# Patient Record
Sex: MALE | Employment: OTHER | URBAN - METROPOLITAN AREA
[De-identification: names, ages, dates, MRNs, and addresses within clinical notes are randomized per-mention and may not be internally consistent; named-entity substitution may affect disease eponyms.]

---

## 2018-11-19 ENCOUNTER — HOSPITAL ENCOUNTER (INPATIENT)
Age: 68
LOS: 1 days | Discharge: HOME | DRG: 247 | End: 2018-11-20
Attending: INTERNAL MEDICINE | Admitting: INTERNAL MEDICINE
Payer: MEDICARE

## 2018-11-19 DIAGNOSIS — I25.10 CAD IN NATIVE ARTERY: ICD-10-CM

## 2018-11-19 PROBLEM — M19.90 ARTHRITIS: Status: ACTIVE | Noted: 2018-11-19

## 2018-11-19 PROBLEM — I10 HTN (HYPERTENSION): Status: ACTIVE | Noted: 2018-11-19

## 2018-11-19 PROBLEM — T82.855A CORONARY STENT RESTENOSIS: Status: ACTIVE | Noted: 2018-11-19

## 2018-11-19 PROBLEM — E78.5 HLD (HYPERLIPIDEMIA): Status: ACTIVE | Noted: 2018-11-19

## 2018-11-19 PROBLEM — I21.4 NON-STEMI (NON-ST ELEVATED MYOCARDIAL INFARCTION) (HCC): Status: ACTIVE | Noted: 2018-11-19

## 2018-11-19 PROBLEM — Z79.02 ENCOUNTER FOR LONG-TERM (CURRENT) USE OF ANTIPLATELETS/ANTITHROMBOTICS: Status: ACTIVE | Noted: 2018-11-19

## 2018-11-19 PROBLEM — Z95.5 STATUS POST INSERTION OF DRUG-ELUTING STENT INTO LEFT ANTERIOR DESCENDING (LAD) ARTERY: Status: ACTIVE | Noted: 2018-11-19

## 2018-11-19 LAB
ABO/RH: NORMAL
ANTIBODY SCREEN: NORMAL
CREAT SERPL-MCNC: 0.9 MG/DL (ref 0.7–1.2)
GFR AFRICAN AMERICAN: >60
GFR NON-AFRICAN AMERICAN: >60 ML/MIN/1.73
POC ACT LR: 309 SECONDS
POC ACT LR: 383 SECONDS

## 2018-11-19 PROCEDURE — C1725 CATH, TRANSLUMIN NON-LASER: HCPCS

## 2018-11-19 PROCEDURE — 2500000003 HC RX 250 WO HCPCS

## 2018-11-19 PROCEDURE — 6370000000 HC RX 637 (ALT 250 FOR IP)

## 2018-11-19 PROCEDURE — B2111ZZ FLUOROSCOPY OF MULTIPLE CORONARY ARTERIES USING LOW OSMOLAR CONTRAST: ICD-10-PCS | Performed by: INTERNAL MEDICINE

## 2018-11-19 PROCEDURE — 6370000000 HC RX 637 (ALT 250 FOR IP): Performed by: INTERNAL MEDICINE

## 2018-11-19 PROCEDURE — 93458 L HRT ARTERY/VENTRICLE ANGIO: CPT | Performed by: INTERNAL MEDICINE

## 2018-11-19 PROCEDURE — 92928 PRQ TCAT PLMT NTRAC ST 1 LES: CPT | Performed by: INTERNAL MEDICINE

## 2018-11-19 PROCEDURE — 4A023N7 MEASUREMENT OF CARDIAC SAMPLING AND PRESSURE, LEFT HEART, PERCUTANEOUS APPROACH: ICD-10-PCS | Performed by: INTERNAL MEDICINE

## 2018-11-19 PROCEDURE — 86900 BLOOD TYPING SEROLOGIC ABO: CPT

## 2018-11-19 PROCEDURE — C1874 STENT, COATED/COV W/DEL SYS: HCPCS

## 2018-11-19 PROCEDURE — 86850 RBC ANTIBODY SCREEN: CPT

## 2018-11-19 PROCEDURE — 36415 COLL VENOUS BLD VENIPUNCTURE: CPT

## 2018-11-19 PROCEDURE — 2140000000 HC CCU INTERMEDIATE R&B

## 2018-11-19 PROCEDURE — 86901 BLOOD TYPING SEROLOGIC RH(D): CPT

## 2018-11-19 PROCEDURE — 2580000003 HC RX 258: Performed by: INTERNAL MEDICINE

## 2018-11-19 PROCEDURE — 2580000003 HC RX 258: Performed by: STUDENT IN AN ORGANIZED HEALTH CARE EDUCATION/TRAINING PROGRAM

## 2018-11-19 PROCEDURE — C1887 CATHETER, GUIDING: HCPCS

## 2018-11-19 PROCEDURE — 027034Z DILATION OF CORONARY ARTERY, ONE ARTERY WITH DRUG-ELUTING INTRALUMINAL DEVICE, PERCUTANEOUS APPROACH: ICD-10-PCS | Performed by: INTERNAL MEDICINE

## 2018-11-19 PROCEDURE — 82565 ASSAY OF CREATININE: CPT

## 2018-11-19 PROCEDURE — 6360000002 HC RX W HCPCS

## 2018-11-19 PROCEDURE — 2709999900 HC NON-CHARGEABLE SUPPLY

## 2018-11-19 PROCEDURE — C1894 INTRO/SHEATH, NON-LASER: HCPCS

## 2018-11-19 PROCEDURE — C1769 GUIDE WIRE: HCPCS

## 2018-11-19 PROCEDURE — 85347 COAGULATION TIME ACTIVATED: CPT

## 2018-11-19 PROCEDURE — 02C03ZZ EXTIRPATION OF MATTER FROM CORONARY ARTERY, ONE ARTERY, PERCUTANEOUS APPROACH: ICD-10-PCS | Performed by: INTERNAL MEDICINE

## 2018-11-19 PROCEDURE — C1757 CATH, THROMBECTOMY/EMBOLECT: HCPCS

## 2018-11-19 PROCEDURE — B2151ZZ FLUOROSCOPY OF LEFT HEART USING LOW OSMOLAR CONTRAST: ICD-10-PCS | Performed by: INTERNAL MEDICINE

## 2018-11-19 RX ORDER — ATORVASTATIN CALCIUM 40 MG/1
40 TABLET, FILM COATED ORAL DAILY
COMMUNITY

## 2018-11-19 RX ORDER — SODIUM CHLORIDE 9 MG/ML
INJECTION, SOLUTION INTRAVENOUS ONCE
Status: COMPLETED | OUTPATIENT
Start: 2018-11-19 | End: 2018-11-19

## 2018-11-19 RX ORDER — SODIUM CHLORIDE 0.9 % (FLUSH) 0.9 %
10 SYRINGE (ML) INJECTION EVERY 12 HOURS SCHEDULED
Status: DISCONTINUED | OUTPATIENT
Start: 2018-11-19 | End: 2018-11-20 | Stop reason: HOSPADM

## 2018-11-19 RX ORDER — ATORVASTATIN CALCIUM 40 MG/1
40 TABLET, FILM COATED ORAL DAILY
Status: DISCONTINUED | OUTPATIENT
Start: 2018-11-19 | End: 2018-11-20 | Stop reason: HOSPADM

## 2018-11-19 RX ORDER — ONDANSETRON 2 MG/ML
4 INJECTION INTRAMUSCULAR; INTRAVENOUS EVERY 6 HOURS PRN
Status: DISCONTINUED | OUTPATIENT
Start: 2018-11-19 | End: 2018-11-20 | Stop reason: HOSPADM

## 2018-11-19 RX ORDER — ASPIRIN 81 MG/1
81 TABLET ORAL DAILY
Status: DISCONTINUED | OUTPATIENT
Start: 2018-11-19 | End: 2018-11-20 | Stop reason: HOSPADM

## 2018-11-19 RX ORDER — SODIUM CHLORIDE 9 MG/ML
INJECTION, SOLUTION INTRAVENOUS CONTINUOUS
Status: ACTIVE | OUTPATIENT
Start: 2018-11-19 | End: 2018-11-19

## 2018-11-19 RX ORDER — SODIUM CHLORIDE 0.9 % (FLUSH) 0.9 %
10 SYRINGE (ML) INJECTION PRN
Status: DISCONTINUED | OUTPATIENT
Start: 2018-11-19 | End: 2018-11-20 | Stop reason: HOSPADM

## 2018-11-19 RX ORDER — OXYCODONE HYDROCHLORIDE AND ACETAMINOPHEN 5; 325 MG/1; MG/1
1 TABLET ORAL EVERY 4 HOURS PRN
Status: DISCONTINUED | OUTPATIENT
Start: 2018-11-19 | End: 2018-11-20 | Stop reason: HOSPADM

## 2018-11-19 RX ORDER — DICLOFENAC SODIUM AND MISOPROSTOL 75; 200 MG/1; UG/1
1 TABLET, DELAYED RELEASE ORAL 2 TIMES DAILY
COMMUNITY

## 2018-11-19 RX ORDER — ATORVASTATIN CALCIUM 40 MG/1
40 TABLET, FILM COATED ORAL NIGHTLY
Status: DISCONTINUED | OUTPATIENT
Start: 2018-11-19 | End: 2018-11-19 | Stop reason: SDUPTHER

## 2018-11-19 RX ORDER — ACETAMINOPHEN 325 MG/1
650 TABLET ORAL EVERY 4 HOURS PRN
Status: DISCONTINUED | OUTPATIENT
Start: 2018-11-19 | End: 2018-11-20 | Stop reason: HOSPADM

## 2018-11-19 RX ORDER — ASPIRIN 81 MG/1
81 TABLET ORAL DAILY
Status: DISCONTINUED | OUTPATIENT
Start: 2018-11-19 | End: 2018-11-19 | Stop reason: SDUPTHER

## 2018-11-19 RX ADMIN — TICAGRELOR 90 MG: 90 TABLET ORAL at 21:20

## 2018-11-19 RX ADMIN — METOPROLOL TARTRATE 25 MG: 25 TABLET ORAL at 16:09

## 2018-11-19 RX ADMIN — Medication 10 ML: at 21:21

## 2018-11-19 RX ADMIN — ATORVASTATIN CALCIUM 40 MG: 40 TABLET, FILM COATED ORAL at 21:20

## 2018-11-19 RX ADMIN — SODIUM CHLORIDE: 9 INJECTION, SOLUTION INTRAVENOUS at 09:42

## 2018-11-19 ASSESSMENT — PAIN SCALES - GENERAL
PAINLEVEL_OUTOF10: 0

## 2018-11-19 NOTE — H&P
that he has never smoked. His smokeless tobacco use includes Snuff. ETOH:   has no alcohol history on file. Family History:      Positive as follows:    Family History   Problem Relation Age of Onset    Heart Disease Mother        REVIEW OF SYSTEMS:   Pertinent positives as noted in the HPI. All other systems reviewed and negative. PHYSICAL EXAM:    /82   Pulse 68   Temp 97.5 °F (36.4 °C) (Temporal)   Resp 16   Ht 5' 9\" (1.753 m)   Wt 180 lb (81.6 kg)   BMI 26.58 kg/m²     General appearance:  No apparent distress, appears stated age and cooperative. HEENT:  Normal cephalic, atraumatic without obvious deformity. Pupils equal, round, and reactive to light. Extra ocular muscles intact. Conjunctivae/corneas clear. Neck: Supple, with full range of motion. No jugular venous distention. Trachea midline. Respiratory:  Normal respiratory effort. Clear to auscultation, bilaterally without Rales/Wheezes/Rhonchi. Cardiovascular:  Regular rate and rhythm with normal S1/S2 without murmurs, rubs or gallops. Abdomen: Soft, non-tender, non-distended with normal bowel sounds. Musculoskeletal:  No clubbing, cyanosis or edema bilaterally. Full range of motion without deformity. Skin: Skin color, texture, turgor normal.  No rashes or lesions. Neurologic:  Neurovascularly intact without any focal sensory/motor deficits. Psychiatric:  Alert and oriented, thought content appropriate, normal insight  Capillary Refill: Brisk,< 3 seconds   Peripheral Pulses: +2 palpable, equal bilaterally         Labs:     No results for input(s): WBC, HGB, HCT, PLT in the last 72 hours. No results for input(s): NA, K, CL, CO2, BUN, CREATININE, CALCIUM, PHOS in the last 72 hours. Invalid input(s): MAGNES  No results for input(s): AST, ALT, BILIDIR, BILITOT, ALKPHOS in the last 72 hours. No results for input(s): INR in the last 72 hours. No results for input(s): Verlyn Burn in the last 72 hours.     Urinalysis:

## 2018-11-20 VITALS
BODY MASS INDEX: 26.66 KG/M2 | DIASTOLIC BLOOD PRESSURE: 78 MMHG | HEART RATE: 57 BPM | RESPIRATION RATE: 16 BRPM | WEIGHT: 180 LBS | HEIGHT: 69 IN | SYSTOLIC BLOOD PRESSURE: 110 MMHG | OXYGEN SATURATION: 95 % | TEMPERATURE: 98.2 F

## 2018-11-20 LAB
ANION GAP SERPL CALCULATED.3IONS-SCNC: 13 MMOL/L (ref 7–16)
BUN BLDV-MCNC: 12 MG/DL (ref 8–23)
CALCIUM SERPL-MCNC: 8.9 MG/DL (ref 8.6–10.2)
CHLORIDE BLD-SCNC: 105 MMOL/L (ref 98–107)
CO2: 23 MMOL/L (ref 22–29)
CREAT SERPL-MCNC: 0.9 MG/DL (ref 0.7–1.2)
GFR AFRICAN AMERICAN: >60
GFR NON-AFRICAN AMERICAN: >60 ML/MIN/1.73
GLUCOSE BLD-MCNC: 98 MG/DL (ref 74–99)
HCT VFR BLD CALC: 42.3 % (ref 37–54)
HEMOGLOBIN: 14.7 G/DL (ref 12.5–16.5)
MCH RBC QN AUTO: 32.3 PG (ref 26–35)
MCHC RBC AUTO-ENTMCNC: 34.8 % (ref 32–34.5)
MCV RBC AUTO: 93 FL (ref 80–99.9)
PDW BLD-RTO: 12.3 FL (ref 11.5–15)
PLATELET # BLD: 170 E9/L (ref 130–450)
PMV BLD AUTO: 11.3 FL (ref 7–12)
POTASSIUM REFLEX MAGNESIUM: 3.8 MMOL/L (ref 3.5–5)
POTASSIUM SERPL-SCNC: 3.8 MMOL/L (ref 3.5–5)
RBC # BLD: 4.55 E12/L (ref 3.8–5.8)
SODIUM BLD-SCNC: 141 MMOL/L (ref 132–146)
WBC # BLD: 7.3 E9/L (ref 4.5–11.5)

## 2018-11-20 PROCEDURE — 6370000000 HC RX 637 (ALT 250 FOR IP): Performed by: INTERNAL MEDICINE

## 2018-11-20 PROCEDURE — 80048 BASIC METABOLIC PNL TOTAL CA: CPT

## 2018-11-20 PROCEDURE — 93005 ELECTROCARDIOGRAM TRACING: CPT | Performed by: INTERNAL MEDICINE

## 2018-11-20 PROCEDURE — 85027 COMPLETE CBC AUTOMATED: CPT

## 2018-11-20 PROCEDURE — 36415 COLL VENOUS BLD VENIPUNCTURE: CPT

## 2018-11-20 PROCEDURE — 2580000003 HC RX 258: Performed by: STUDENT IN AN ORGANIZED HEALTH CARE EDUCATION/TRAINING PROGRAM

## 2018-11-20 RX ADMIN — ASPIRIN 81 MG: 81 TABLET ORAL at 10:09

## 2018-11-20 RX ADMIN — METOPROLOL TARTRATE 25 MG: 25 TABLET ORAL at 10:10

## 2018-11-20 RX ADMIN — Medication 10 ML: at 10:11

## 2018-11-20 RX ADMIN — TICAGRELOR 90 MG: 90 TABLET ORAL at 10:09

## 2018-11-20 ASSESSMENT — PAIN SCALES - GENERAL
PAINLEVEL_OUTOF10: 0

## 2018-11-20 NOTE — DISCHARGE SUMMARY
Hospital Medicine Discharge Summary    Patient ID: Oliva Cowden      Patient's PCP: No primary care provider on file. Admit Date: 11/19/2018     Discharge Date:    11/20/18    Admitting Physician: Lincoln Welsh DO     Discharge Physician: Mary Everett MD     Discharge Diagnoses: Active Hospital Problems    Diagnosis Date Noted    CAD in native artery [I25.10] 11/19/2018    HLD (hyperlipidemia) [E78.5] 11/19/2018    HTN (hypertension) Jayla Lawrence 11/19/2018    Arthritis [M19.90] 11/19/2018    Coronary stent restenosis [T82.855A] 11/19/2018    Encounter for long-term (current) use of antiplatelets/antithrombotics [Z79.02] 11/19/2018    Non-STEMI (non-ST elevated myocardial infarction) ([facility] [address] [I21.4] 11/19/2018    Status post insertion of drug-eluting stent into left anterior descending (LAD) artery [Z95.5] 11/19/2018       The patient was seen and examined on day of discharge and this discharge summary is in conjunction with any daily progress note from day of discharge. Admission HPI: 80 y.o. male who presented to [address] [place] [address] with chest pain. History obtained from the patient. He states he developed chest pain on Friday while cleaning up water from overflowing sink. He describes the pain as pressure that radiated down his arm causing shortness of breath. He was seen at [facility] then transferred to [facility] for CVL services. He did go the CVL with Dr. Hayes Fulton and had PCI placed to mid LAD. He was started on brilinta and ASA, atorvastatin and metoprolol. He has PMH of HTN, HLD, and arthritis. Hospital Course:   Mr. Oliva Cowden, a 80y.o. year old male  who  has a past medical history of Arthritis; CAD (coronary artery disease); Hyperlipidemia; and Hypertension. During the course the patient's hospital stay was transferred from [facility] for CAD- NSTEMI- s/p PCI mid LAD by cardiology. Pt felt clinically better after procedure.  He is to follow up with cardiology Details   ticagrelor (BRILINTA) 90 MG TABS tablet Take 1 tablet by mouth 2 times daily  Qty: 60 tablet, Refills: 0              Details   metoprolol tartrate (LOPRESSOR) 25 MG tablet Take 25 mg by mouth daily      atorvastatin (LIPITOR) 40 MG tablet Take 40 mg by mouth daily      diclofenac-Misoprostol (ARTHROTEC) 75-0.2 MG per tablet Take 1 tablet by mouth 2 times daily      aspirin 81 MG tablet Take 81 mg by mouth daily      Multiple Vitamin (MULTI-DAY PO) Take 1 tablet by mouth daily             Time Spent on discharge is more than 31 min in the examination, evaluation, counseling and review of medications and discharge plan. Jt Mayorga MD   11/20/2018      Thank you No primary care provider on file. for the opportunity to be involved in this patient's care. If you have any questions or concerns please feel free to contact me. NOTE: This report was transcribed using voice recognition software. Every effort was made to ensure accuracy; however, inadvertent computerized transcription errors may be present.

## 2018-11-20 NOTE — PROGRESS NOTES
CLINICAL PHARMACY NOTE: MEDS TO [address] Select Patient?: No  Total # of Prescriptions Filled: 1   The following medications were delivered to the patient:  · brilinta 90mg  Total # of Interventions Completed: 3  Time Spent (min): 30    Additional Documentation:

## 2018-11-22 LAB
EKG ATRIAL RATE: 52 BPM
EKG P AXIS: 48 DEGREES
EKG P-R INTERVAL: 140 MS
EKG Q-T INTERVAL: 402 MS
EKG QRS DURATION: 86 MS
EKG QTC CALCULATION (BAZETT): 373 MS
EKG R AXIS: -7 DEGREES
EKG T AXIS: 51 DEGREES
EKG VENTRICULAR RATE: 52 BPM

## 2018-12-13 ENCOUNTER — APPOINTMENT (OUTPATIENT)
Dept: GENERAL RADIOLOGY | Age: 68
End: 2018-12-13
Payer: MEDICARE

## 2018-12-13 ENCOUNTER — HOSPITAL ENCOUNTER (EMERGENCY)
Age: 68
Discharge: HOME | End: 2018-12-13
Attending: EMERGENCY MEDICINE
Payer: MEDICARE

## 2018-12-13 VITALS
WEIGHT: 108 LBS | HEART RATE: 80 BPM | TEMPERATURE: 98.3 F | SYSTOLIC BLOOD PRESSURE: 124 MMHG | OXYGEN SATURATION: 98 % | DIASTOLIC BLOOD PRESSURE: 68 MMHG | RESPIRATION RATE: 14 BRPM | HEIGHT: 69 IN | BODY MASS INDEX: 16 KG/M2

## 2018-12-13 DIAGNOSIS — R07.89 ATYPICAL CHEST PAIN: Primary | ICD-10-CM

## 2018-12-13 LAB
ALBUMIN SERPL-MCNC: 4.5 G/DL (ref 3.5–5.2)
ALP BLD-CCNC: 78 U/L (ref 40–129)
ALT SERPL-CCNC: 30 U/L (ref 0–40)
ANION GAP SERPL CALCULATED.3IONS-SCNC: 12 MMOL/L (ref 7–16)
AST SERPL-CCNC: 30 U/L (ref 0–39)
BASOPHILS ABSOLUTE: 0.07 E9/L (ref 0–0.2)
BASOPHILS RELATIVE PERCENT: 1.2 % (ref 0–2)
BILIRUB SERPL-MCNC: 0.5 MG/DL (ref 0–1.2)
BUN BLDV-MCNC: 12 MG/DL (ref 8–23)
CALCIUM SERPL-MCNC: 9.8 MG/DL (ref 8.6–10.2)
CHLORIDE BLD-SCNC: 105 MMOL/L (ref 98–107)
CO2: 25 MMOL/L (ref 22–29)
CREAT SERPL-MCNC: 0.9 MG/DL (ref 0.7–1.2)
EOSINOPHILS ABSOLUTE: 0.25 E9/L (ref 0.05–0.5)
EOSINOPHILS RELATIVE PERCENT: 4.2 % (ref 0–6)
GFR AFRICAN AMERICAN: >60
GFR NON-AFRICAN AMERICAN: >60 ML/MIN/1.73
GLUCOSE BLD-MCNC: 120 MG/DL (ref 74–99)
HCT VFR BLD CALC: 47.8 % (ref 37–54)
HEMOGLOBIN: 16.3 G/DL (ref 12.5–16.5)
IMMATURE GRANULOCYTES #: 0.01 E9/L
IMMATURE GRANULOCYTES %: 0.2 % (ref 0–5)
LYMPHOCYTES ABSOLUTE: 0.89 E9/L (ref 1.5–4)
LYMPHOCYTES RELATIVE PERCENT: 15 % (ref 20–42)
MCH RBC QN AUTO: 32.4 PG (ref 26–35)
MCHC RBC AUTO-ENTMCNC: 34.1 % (ref 32–34.5)
MCV RBC AUTO: 95 FL (ref 80–99.9)
MONOCYTES ABSOLUTE: 0.74 E9/L (ref 0.1–0.95)
MONOCYTES RELATIVE PERCENT: 12.5 % (ref 2–12)
NEUTROPHILS ABSOLUTE: 3.97 E9/L (ref 1.8–7.3)
NEUTROPHILS RELATIVE PERCENT: 66.9 % (ref 43–80)
PDW BLD-RTO: 12.4 FL (ref 11.5–15)
PLATELET # BLD: 218 E9/L (ref 130–450)
PMV BLD AUTO: 10.8 FL (ref 7–12)
POTASSIUM SERPL-SCNC: 4.5 MMOL/L (ref 3.5–5)
PRO-BNP: 142 PG/ML (ref 0–125)
RBC # BLD: 5.03 E12/L (ref 3.8–5.8)
SODIUM BLD-SCNC: 142 MMOL/L (ref 132–146)
TOTAL PROTEIN: 7.5 G/DL (ref 6.4–8.3)
TROPONIN: <0.01 NG/ML (ref 0–0.03)
TROPONIN: <0.01 NG/ML (ref 0–0.03)
WBC # BLD: 5.9 E9/L (ref 4.5–11.5)

## 2018-12-13 PROCEDURE — 71045 X-RAY EXAM CHEST 1 VIEW: CPT

## 2018-12-13 PROCEDURE — 83880 ASSAY OF NATRIURETIC PEPTIDE: CPT

## 2018-12-13 PROCEDURE — 6370000000 HC RX 637 (ALT 250 FOR IP): Performed by: STUDENT IN AN ORGANIZED HEALTH CARE EDUCATION/TRAINING PROGRAM

## 2018-12-13 PROCEDURE — 36415 COLL VENOUS BLD VENIPUNCTURE: CPT

## 2018-12-13 PROCEDURE — 84484 ASSAY OF TROPONIN QUANT: CPT

## 2018-12-13 PROCEDURE — 85025 COMPLETE CBC W/AUTO DIFF WBC: CPT

## 2018-12-13 PROCEDURE — 99285 EMERGENCY DEPT VISIT HI MDM: CPT

## 2018-12-13 PROCEDURE — 80053 COMPREHEN METABOLIC PANEL: CPT

## 2018-12-13 RX ORDER — ASPIRIN 81 MG/1
81 TABLET, CHEWABLE ORAL ONCE
Status: COMPLETED | OUTPATIENT
Start: 2018-12-13 | End: 2018-12-13

## 2018-12-13 RX ORDER — ASPIRIN 81 MG/1
162 TABLET, CHEWABLE ORAL ONCE
Status: COMPLETED | OUTPATIENT
Start: 2018-12-13 | End: 2018-12-13

## 2018-12-13 RX ORDER — NITROGLYCERIN 0.3 MG/1
TABLET SUBLINGUAL
Qty: 30 TABLET | Refills: 0 | Status: SHIPPED | OUTPATIENT
Start: 2018-12-13

## 2018-12-13 RX ADMIN — ASPIRIN 81 MG CHEWABLE TABLET 81 MG: 81 TABLET CHEWABLE at 10:49

## 2018-12-13 RX ADMIN — ASPIRIN 81 MG CHEWABLE TABLET 162 MG: 81 TABLET CHEWABLE at 10:49

## 2018-12-13 ASSESSMENT — ENCOUNTER SYMPTOMS
SHORTNESS OF BREATH: 0
WHEEZING: 0
DIARRHEA: 0
ABDOMINAL PAIN: 0
COUGH: 0
CHOKING: 0
CONSTIPATION: 0
CHEST TIGHTNESS: 0
BLOOD IN STOOL: 0

## 2018-12-13 ASSESSMENT — HEART SCORE: ECG: 1

## 2018-12-13 NOTE — CONSULTS
[address] CONSULT    Name: Nimco Simmons    Age: 80 y.o. Date of Admission: 12/13/2018  9:54 AM    Date of Service: 12/13/2018  Chest pain  Reason for Consultation: Chest pain    Referring Physician: Dr. Chuck Geller    History of Present Illness: The patient is a 80y.o. year old male who presents to the emergency department with vague non-exertional upper sternal discomfort at rest without radiation and no associated nausea, vomiting or diaphoresis. No pleuritic component and the symptoms are dissimilar to what he experienced 3-4 weeks ago during a non-STEMI. Denies PND, orthopnea, palpitations, syncope or near syncope. Previous records reviewed, and he has history of coronary artery disease with LAD and RCA stents at the [facility] in [address] [place] [address], non-STEMI last month with subsequent heart catheterization showing 99% LAD in-stent restenosis and 50% RCA in-stent restenosis. He underwent PTCA, aspiration thrombectomy and 2.75/18 ETHAN ROSA to the LAD. Left ventriculogram showed anteroapical hypokinesia with estimated LVEF 45%. He denies noncompliance with a dual antiplatelet therapy and has been able to perform moderate to heavy activities without exertional chest pain. Past Medical History:   Past Medical History:   Diagnosis Date    Arthritis     CAD (coronary artery disease)     11/19/18 ethan rosa stent 2.75x18 to mid lad.  Hyperlipidemia     Hypertension        Past Surgical History:  Past Surgical History:   Procedure Laterality Date    APPENDECTOMY      COLONOSCOPY      HERNIA REPAIR         Family History:  Family History   Problem Relation Age of Onset    Heart Disease Mother        Social History:  Social History     Social History    Marital status:      Spouse name: N/A    Number of children: N/A    Years of education: N/A     Occupational History    Not on file.      Social History Main Topics    Smoking status: Never Smoker

## 2018-12-13 NOTE — ED PROVIDER NOTES
FIRST PROVIDER CONTACT ASSESSMENT NOTE      Department of Emergency Medicine   12/13/18  9:47 AM    Chief Complaint: Chest Pain (chest discomfort and anxious feeling, had heart stent placed 2 weeks ago)      History of Present Illness:    Corina Brown is a 80 y.o. male who presents to the ED by private car for chest discomfort. Had stent placed 11/16/18 by Dr. Tiffanie Mohamud. Anxiousness and discomfort started last night. Cardiologist is Dr. Juan M Benz. Focused Screening Exam:  Constitutional:  Alert, appears stated age and is in no distress. *ALLERGIES*     Patient has no known allergies.      ED Triage Vitals [12/13/18 0946]   BP Temp Temp Source Pulse Resp SpO2 Height Weight   138/88 97.9 °F (36.6 °C) Temporal 83 16 96 % 5' 9\" (1.753 m) 108 lb (49 kg)        Initial Plan of Care:  Initiate Treatment-Testing, Proceed toTreatment Area When Bed Available for ED Attending/MLP to Continue Care    -----------------END OF FIRST PROVIDER CONTACT ASSESSMENT NOTE--------------  Electronically signed by Yara Rodriguez PA-C   DD: 12/13/18        Tahir Harrell PA-C  12/13/18 4284

## 2018-12-21 LAB
EKG ATRIAL RATE: 49 BPM
EKG P AXIS: 34 DEGREES
EKG P-R INTERVAL: 152 MS
EKG Q-T INTERVAL: 474 MS
EKG QRS DURATION: 90 MS
EKG QTC CALCULATION (BAZETT): 428 MS
EKG R AXIS: 11 DEGREES
EKG T AXIS: 95 DEGREES
EKG VENTRICULAR RATE: 49 BPM

## 2019-02-05 ENCOUNTER — HOSPITAL ENCOUNTER (EMERGENCY)
Dept: HOSPITAL 25 - UCCORT | Age: 69
Discharge: LEFT BEFORE DISCHARGE | End: 2019-02-05
Payer: COMMERCIAL

## 2019-02-05 VITALS — SYSTOLIC BLOOD PRESSURE: 150 MMHG | DIASTOLIC BLOOD PRESSURE: 68 MMHG

## 2019-02-05 DIAGNOSIS — R91.8: ICD-10-CM

## 2019-02-05 DIAGNOSIS — R06.00: ICD-10-CM

## 2019-02-05 DIAGNOSIS — J90: Primary | ICD-10-CM

## 2019-02-05 PROCEDURE — G0463 HOSPITAL OUTPT CLINIC VISIT: HCPCS

## 2019-02-05 PROCEDURE — 71046 X-RAY EXAM CHEST 2 VIEWS: CPT

## 2019-02-05 PROCEDURE — 99202 OFFICE O/P NEW SF 15 MIN: CPT

## 2019-02-06 NOTE — UC
- Progress Note


Progress Note: 


Radiologist reading of the chest x-ray from February 5, 2019 impression is 

cardiomegaly pulmonary interstitial edema and small right pleural effusion.





Provider interpretation is pleural effusion and infiltrate.





Nursing to call the patient and inform him of his pulmonary interstitial edema 

which could be congestive heart failure and recommend that he be evaluated by 

medical provider.





Course/Dx





- Diagnoses


Provider Diagnoses: 


 Pleural effusion, Dyspnea, Infiltrate noted on imaging study








Discharge





- Sign-Out/Discharge


Documenting (check all that apply): Patient Departure


All imaging exams completed and their final reports reviewed: Yes





- Discharge Plan


Condition: Stable


Disposition: AGAINST MEDICAL ADVICE


Referrals: 


No Primary Care Phys,NOPCP [Primary Care Provider] - 





- Billing Disposition and Condition


Condition: STABLE


Disposition: Against Medical Advice

## 2023-11-06 LAB
BILIRUB UR QL STRIP: NEGATIVE
CLARITY UR: CLEAR
COLOR UR: YELLOW
GLUCOSE UR STRIP-MCNC: NEGATIVE MG/DL
HGB UR QL STRIP.AUTO: NEGATIVE
KETONES UR STRIP-MCNC: NEGATIVE MG/DL
LEUKOCYTE ESTERASE UR QL STRIP: ABNORMAL
NITRITE UR QL STRIP: NEGATIVE
PH UR STRIP: 6 [PH] (ref 5–9)
PROT UR STRIP-MCNC: NEGATIVE MG/DL
RBC #/AREA URNS HPF: ABNORMAL /HPF
SP GR UR STRIP: 1.01 (ref 1–1.03)
UROBILINOGEN UR STRIP-ACNC: 0.2 EU/DL (ref 0–1)
WBC #/AREA URNS HPF: ABNORMAL /HPF

## 2023-11-27 LAB
BILIRUB UR QL STRIP: NEGATIVE
CLARITY UR: CLEAR
COLOR UR: YELLOW
GLUCOSE UR STRIP-MCNC: NEGATIVE MG/DL
HGB UR QL STRIP.AUTO: NEGATIVE
KETONES UR STRIP-MCNC: NEGATIVE MG/DL
LEUKOCYTE ESTERASE UR QL STRIP: NEGATIVE
NITRITE UR QL STRIP: NEGATIVE
PH UR STRIP: 6.5 [PH] (ref 5–9)
PROT UR STRIP-MCNC: NEGATIVE MG/DL
RBC #/AREA URNS HPF: NORMAL /HPF
SP GR UR STRIP: 1.02 (ref 1–1.03)
UROBILINOGEN UR STRIP-ACNC: 1 EU/DL (ref 0–1)
WBC #/AREA URNS HPF: NORMAL /HPF

## 2024-01-27 ENCOUNTER — APPOINTMENT (OUTPATIENT)
Dept: GENERAL RADIOLOGY | Age: 74
End: 2024-01-27
Payer: MEDICARE

## 2024-01-27 ENCOUNTER — HOSPITAL ENCOUNTER (OUTPATIENT)
Age: 74
Setting detail: OBSERVATION
Discharge: HOME | End: 2024-01-29
Attending: EMERGENCY MEDICINE | Admitting: STUDENT IN AN ORGANIZED HEALTH CARE EDUCATION/TRAINING PROGRAM
Payer: MEDICARE

## 2024-01-27 DIAGNOSIS — R07.9 CHEST PAIN, UNSPECIFIED TYPE: Primary | ICD-10-CM

## 2024-01-27 DIAGNOSIS — Z95.5 STATUS POST INSERTION OF DRUG-ELUTING STENT INTO LEFT ANTERIOR DESCENDING (LAD) ARTERY: ICD-10-CM

## 2024-01-27 DIAGNOSIS — I25.118 ATHEROSCLEROSIS OF NATIVE CORONARY ARTERY OF NATIVE HEART WITH STABLE ANGINA PECTORIS (HCC): ICD-10-CM

## 2024-01-27 LAB
ALBUMIN SERPL-MCNC: 4.5 G/DL (ref 3.5–5.2)
ALP SERPL-CCNC: 70 U/L (ref 40–129)
ALT SERPL-CCNC: 28 U/L (ref 0–40)
ANION GAP SERPL CALCULATED.3IONS-SCNC: 10 MMOL/L (ref 7–16)
AST SERPL-CCNC: 30 U/L (ref 0–39)
BASOPHILS # BLD: 0.07 K/UL (ref 0–0.2)
BASOPHILS NFR BLD: 1 % (ref 0–2)
BILIRUB SERPL-MCNC: 0.5 MG/DL (ref 0–1.2)
BUN SERPL-MCNC: 14 MG/DL (ref 6–23)
CALCIUM SERPL-MCNC: 10.2 MG/DL (ref 8.6–10.2)
CHLORIDE SERPL-SCNC: 101 MMOL/L (ref 98–107)
CO2 SERPL-SCNC: 29 MMOL/L (ref 22–29)
CREAT SERPL-MCNC: 1 MG/DL (ref 0.7–1.2)
EOSINOPHIL # BLD: 0.32 K/UL (ref 0.05–0.5)
EOSINOPHILS RELATIVE PERCENT: 5 % (ref 0–6)
ERYTHROCYTE [DISTWIDTH] IN BLOOD BY AUTOMATED COUNT: 12.5 % (ref 11.5–15)
GFR SERPL CREATININE-BSD FRML MDRD: >60 ML/MIN/1.73M2
GLUCOSE SERPL-MCNC: 116 MG/DL (ref 74–99)
HCT VFR BLD AUTO: 48.1 % (ref 37–54)
HGB BLD-MCNC: 16.1 G/DL (ref 12.5–16.5)
IMM GRANULOCYTES # BLD AUTO: <0.03 K/UL (ref 0–0.58)
IMM GRANULOCYTES NFR BLD: 0 % (ref 0–5)
INR PPP: 1.1
LYMPHOCYTES NFR BLD: 1.47 K/UL (ref 1.5–4)
LYMPHOCYTES RELATIVE PERCENT: 24 % (ref 20–42)
MAGNESIUM SERPL-MCNC: 2 MG/DL (ref 1.6–2.6)
MCH RBC QN AUTO: 31.8 PG (ref 26–35)
MCHC RBC AUTO-ENTMCNC: 33.5 G/DL (ref 32–34.5)
MCV RBC AUTO: 94.9 FL (ref 80–99.9)
MONOCYTES NFR BLD: 0.69 K/UL (ref 0.1–0.95)
MONOCYTES NFR BLD: 11 % (ref 2–12)
NEUTROPHILS NFR BLD: 59 % (ref 43–80)
NEUTS SEG NFR BLD: 3.65 K/UL (ref 1.8–7.3)
PLATELET # BLD AUTO: 196 K/UL (ref 130–450)
PMV BLD AUTO: 10.7 FL (ref 7–12)
POTASSIUM SERPL-SCNC: 3.9 MMOL/L (ref 3.5–5)
PROT SERPL-MCNC: 7.2 G/DL (ref 6.4–8.3)
PROTHROMBIN TIME: 12.2 SEC (ref 9.3–12.4)
RBC # BLD AUTO: 5.07 M/UL (ref 3.8–5.8)
SODIUM SERPL-SCNC: 140 MMOL/L (ref 132–146)
TROPONIN I SERPL HS-MCNC: 10 NG/L (ref 0–11)
WBC OTHER # BLD: 6.2 K/UL (ref 4.5–11.5)

## 2024-01-27 PROCEDURE — 84484 ASSAY OF TROPONIN QUANT: CPT

## 2024-01-27 PROCEDURE — 80053 COMPREHEN METABOLIC PANEL: CPT

## 2024-01-27 PROCEDURE — 71046 X-RAY EXAM CHEST 2 VIEWS: CPT

## 2024-01-27 PROCEDURE — 85025 COMPLETE CBC W/AUTO DIFF WBC: CPT

## 2024-01-27 PROCEDURE — 83735 ASSAY OF MAGNESIUM: CPT

## 2024-01-27 PROCEDURE — 99285 EMERGENCY DEPT VISIT HI MDM: CPT

## 2024-01-27 PROCEDURE — 85610 PROTHROMBIN TIME: CPT

## 2024-01-27 ASSESSMENT — LIFESTYLE VARIABLES
HOW MANY STANDARD DRINKS CONTAINING ALCOHOL DO YOU HAVE ON A TYPICAL DAY: PATIENT DOES NOT DRINK
HOW OFTEN DO YOU HAVE A DRINK CONTAINING ALCOHOL: NEVER

## 2024-01-27 ASSESSMENT — PAIN - FUNCTIONAL ASSESSMENT: PAIN_FUNCTIONAL_ASSESSMENT: 0-10

## 2024-01-27 ASSESSMENT — PAIN DESCRIPTION - LOCATION: LOCATION: CHEST

## 2024-01-27 ASSESSMENT — PAIN DESCRIPTION - PAIN TYPE: TYPE: ACUTE PAIN

## 2024-01-27 ASSESSMENT — PAIN SCALES - GENERAL: PAINLEVEL_OUTOF10: 2

## 2024-01-27 ASSESSMENT — PAIN DESCRIPTION - DESCRIPTORS: DESCRIPTORS: DISCOMFORT

## 2024-01-28 PROBLEM — I25.118 ATHEROSCLEROSIS OF NATIVE CORONARY ARTERY OF NATIVE HEART WITH STABLE ANGINA PECTORIS (HCC): Status: ACTIVE | Noted: 2018-11-19

## 2024-01-28 PROBLEM — R07.9 CHEST PAIN: Status: ACTIVE | Noted: 2024-01-28

## 2024-01-28 LAB
ALBUMIN SERPL-MCNC: 4 G/DL (ref 3.5–5.2)
ALP SERPL-CCNC: 62 U/L (ref 40–129)
ALT SERPL-CCNC: 25 U/L (ref 0–40)
ANION GAP SERPL CALCULATED.3IONS-SCNC: 8 MMOL/L (ref 7–16)
AST SERPL-CCNC: 27 U/L (ref 0–39)
BILIRUB SERPL-MCNC: 0.8 MG/DL (ref 0–1.2)
BUN SERPL-MCNC: 14 MG/DL (ref 6–23)
CALCIUM SERPL-MCNC: 10 MG/DL (ref 8.6–10.2)
CHLORIDE SERPL-SCNC: 104 MMOL/L (ref 98–107)
CHOLEST SERPL-MCNC: 136 MG/DL
CO2 SERPL-SCNC: 29 MMOL/L (ref 22–29)
CREAT SERPL-MCNC: 1 MG/DL (ref 0.7–1.2)
GFR SERPL CREATININE-BSD FRML MDRD: >60 ML/MIN/1.73M2
GLUCOSE SERPL-MCNC: 106 MG/DL (ref 74–99)
HBA1C MFR BLD: 6 % (ref 4–5.6)
HDLC SERPL-MCNC: 36 MG/DL
LDLC SERPL CALC-MCNC: 76 MG/DL
POTASSIUM SERPL-SCNC: 3.9 MMOL/L (ref 3.5–5)
PROT SERPL-MCNC: 6.5 G/DL (ref 6.4–8.3)
SODIUM SERPL-SCNC: 141 MMOL/L (ref 132–146)
TRIGL SERPL-MCNC: 121 MG/DL
TROPONIN I SERPL HS-MCNC: 11 NG/L (ref 0–11)
TROPONIN I SERPL HS-MCNC: 14 NG/L (ref 0–11)
TROPONIN I SERPL HS-MCNC: 14 NG/L (ref 0–11)
VLDLC SERPL CALC-MCNC: 24 MG/DL

## 2024-01-28 PROCEDURE — 80053 COMPREHEN METABOLIC PANEL: CPT

## 2024-01-28 PROCEDURE — 99221 1ST HOSP IP/OBS SF/LOW 40: CPT | Performed by: STUDENT IN AN ORGANIZED HEALTH CARE EDUCATION/TRAINING PROGRAM

## 2024-01-28 PROCEDURE — 96372 THER/PROPH/DIAG INJ SC/IM: CPT

## 2024-01-28 PROCEDURE — 2580000003 HC RX 258

## 2024-01-28 PROCEDURE — 83036 HEMOGLOBIN GLYCOSYLATED A1C: CPT

## 2024-01-28 PROCEDURE — 80061 LIPID PANEL: CPT

## 2024-01-28 PROCEDURE — 6360000002 HC RX W HCPCS

## 2024-01-28 PROCEDURE — G0378 HOSPITAL OBSERVATION PER HR: HCPCS

## 2024-01-28 PROCEDURE — 84484 ASSAY OF TROPONIN QUANT: CPT

## 2024-01-28 PROCEDURE — APPSS45 APP SPLIT SHARED TIME 31-45 MINUTES

## 2024-01-28 PROCEDURE — 93005 ELECTROCARDIOGRAM TRACING: CPT

## 2024-01-28 PROCEDURE — 93005 ELECTROCARDIOGRAM TRACING: CPT | Performed by: EMERGENCY MEDICINE

## 2024-01-28 RX ORDER — ASPIRIN 81 MG/1
81 TABLET, CHEWABLE ORAL DAILY
Status: DISCONTINUED | OUTPATIENT
Start: 2024-01-29 | End: 2024-01-29 | Stop reason: HOSPADM

## 2024-01-28 RX ORDER — SODIUM CHLORIDE 9 MG/ML
INJECTION, SOLUTION INTRAVENOUS PRN
Status: DISCONTINUED | OUTPATIENT
Start: 2024-01-28 | End: 2024-01-29 | Stop reason: HOSPADM

## 2024-01-28 RX ORDER — ATORVASTATIN CALCIUM 40 MG/1
40 TABLET, FILM COATED ORAL NIGHTLY
Status: DISCONTINUED | OUTPATIENT
Start: 2024-01-28 | End: 2024-01-29 | Stop reason: HOSPADM

## 2024-01-28 RX ORDER — GABAPENTIN 300 MG/1
300 CAPSULE ORAL 3 TIMES DAILY
COMMUNITY

## 2024-01-28 RX ORDER — ONDANSETRON 2 MG/ML
4 INJECTION INTRAMUSCULAR; INTRAVENOUS EVERY 6 HOURS PRN
Status: DISCONTINUED | OUTPATIENT
Start: 2024-01-28 | End: 2024-01-29 | Stop reason: HOSPADM

## 2024-01-28 RX ORDER — POTASSIUM CHLORIDE 20 MEQ/1
40 TABLET, EXTENDED RELEASE ORAL PRN
Status: DISCONTINUED | OUTPATIENT
Start: 2024-01-28 | End: 2024-01-29 | Stop reason: HOSPADM

## 2024-01-28 RX ORDER — ENOXAPARIN SODIUM 100 MG/ML
40 INJECTION SUBCUTANEOUS DAILY
Status: DISCONTINUED | OUTPATIENT
Start: 2024-01-28 | End: 2024-01-29 | Stop reason: HOSPADM

## 2024-01-28 RX ORDER — ONDANSETRON 4 MG/1
4 TABLET, ORALLY DISINTEGRATING ORAL EVERY 8 HOURS PRN
Status: DISCONTINUED | OUTPATIENT
Start: 2024-01-28 | End: 2024-01-29 | Stop reason: HOSPADM

## 2024-01-28 RX ORDER — SODIUM CHLORIDE 0.9 % (FLUSH) 0.9 %
5-40 SYRINGE (ML) INJECTION PRN
Status: DISCONTINUED | OUTPATIENT
Start: 2024-01-28 | End: 2024-01-29 | Stop reason: HOSPADM

## 2024-01-28 RX ORDER — SODIUM CHLORIDE 0.9 % (FLUSH) 0.9 %
5-40 SYRINGE (ML) INJECTION EVERY 12 HOURS SCHEDULED
Status: DISCONTINUED | OUTPATIENT
Start: 2024-01-28 | End: 2024-01-29 | Stop reason: HOSPADM

## 2024-01-28 RX ORDER — ACETAMINOPHEN 650 MG/1
650 SUPPOSITORY RECTAL EVERY 6 HOURS PRN
Status: DISCONTINUED | OUTPATIENT
Start: 2024-01-28 | End: 2024-01-29 | Stop reason: HOSPADM

## 2024-01-28 RX ORDER — ACETAMINOPHEN 325 MG/1
650 TABLET ORAL EVERY 6 HOURS PRN
Status: DISCONTINUED | OUTPATIENT
Start: 2024-01-28 | End: 2024-01-29 | Stop reason: HOSPADM

## 2024-01-28 RX ORDER — POLYETHYLENE GLYCOL 3350 17 G/17G
17 POWDER, FOR SOLUTION ORAL DAILY PRN
Status: DISCONTINUED | OUTPATIENT
Start: 2024-01-28 | End: 2024-01-29 | Stop reason: HOSPADM

## 2024-01-28 RX ORDER — POTASSIUM CHLORIDE 7.45 MG/ML
10 INJECTION INTRAVENOUS PRN
Status: DISCONTINUED | OUTPATIENT
Start: 2024-01-28 | End: 2024-01-29 | Stop reason: HOSPADM

## 2024-01-28 RX ORDER — MAGNESIUM SULFATE IN WATER 40 MG/ML
2000 INJECTION, SOLUTION INTRAVENOUS PRN
Status: DISCONTINUED | OUTPATIENT
Start: 2024-01-28 | End: 2024-01-29 | Stop reason: HOSPADM

## 2024-01-28 RX ADMIN — ENOXAPARIN SODIUM 40 MG: 100 INJECTION SUBCUTANEOUS at 10:23

## 2024-01-28 RX ADMIN — Medication 10 ML: at 09:19

## 2024-01-28 RX ADMIN — Medication 10 ML: at 21:00

## 2024-01-28 ASSESSMENT — PAIN SCALES - GENERAL: PAINLEVEL_OUTOF10: 0

## 2024-01-28 NOTE — ED NOTES
ED to Inpatient Handoff Report    Notified Isamar that electronic handoff available and patient ready for transport to room 530.    Safety Risks: None identified    Patient in Restraints: no    Constant Observer or Patient : no    Telemetry Monitoring Ordered :Yes           Order to transfer to unit without monitor:NO    Last MEWS: 1 Time completed: 1411    Deterioration Index Score:   Predictive Model Details          19 (Normal)  Factor Value    Calculated 1/28/2024 14:15 78% Age 73 years old    Deterioration Index Model 7% Systolic 134     5% Pulse 56     4% Respiratory rate 17     4% Sodium 141 mmol/L     1% Hematocrit 48.1 %     0% Temperature 97.9 °F (36.6 °C)     0% Pulse oximetry 95 %     0% WBC count 6.2 k/uL     0% Potassium 3.9 mmol/L        Vitals:    01/28/24 1053 01/28/24 1200 01/28/24 1316 01/28/24 1411   BP: 124/82 128/84 123/78 (!) 134/91   Pulse: 58 57 (!) 48 56   Resp: 16 17 18 17   Temp:    97.9 °F (36.6 °C)   TempSrc:       SpO2: 94% 94% 94% 95%   Weight:       Height:             Opportunity for questions and clarification was provided.

## 2024-01-28 NOTE — ED PROVIDER NOTES
HPI:  1/27/24,   Time: 10:39 PM SHINE Dia is a 73 y.o. male presenting to the ED for cp, beginning 1 days ago.  The complaint has been persistent, moderate in severity, and worsened by nothing.  History of CAD.  Called by self with cardiology.  Does have stents placed.  No recent stress test or heart catheterization.  Some shortness of breath.  Pressure is dull.  Intermittent.  Took full dose aspirin today.    Review of Systems:   Pertinent positives and negatives are stated within HPI, all other systems reviewed and are negative.          --------------------------------------------- PAST HISTORY ---------------------------------------------  Past Medical History:  has a past medical history of Arthritis, CAD (coronary artery disease), Hyperlipidemia, and Hypertension.    Past Surgical History:  has a past surgical history that includes hernia repair; Appendectomy; and Colonoscopy.    Social History:  reports that he has never smoked. His smokeless tobacco use includes snuff.    Family History: family history includes Heart Disease in his mother.     The patient’s home medications have been reviewed.    Allergies: Patient has no known allergies.        ---------------------------------------------------PHYSICAL EXAM--------------------------------------    Constitutional/General: Alert and oriented x3, well appearing, non toxic in NAD  Head: Normocephalic and atraumatic  Eyes: PERRL, EOMI, conjunctive normal, sclera non icteric  Mouth: Oropharynx clear, handling secretions,   Neck: Supple, full ROM,   Respiratory:  Not in respiratory distress  Cardiovascular:  Regular rate. Regular rhythm. . 2+ distal pulses  Chest: No chest wall tenderness  GI:  Abdomen Soft, Non tender, Non distended.  .   Musculoskeletal: Moves all extremities x 4. Warm and well perfused,   Integument: skin warm and dry. No rashes.   Lymphatic: no lymphadenopathy noted  Neurologic: GCS 15, no focal deficits,   Psychiatric: Normal

## 2024-01-28 NOTE — H&P
[facility] Hospitalist Group History and Physical      CHIEF COMPLAINT:  Chest pain    History of Present Illness:  This is a 73-year-old male with a past medical history of CAD, hyperlipidemia, and hypertension who presents to the ED with chest pain.  Patient states he began to have intermittent, moderate, aching chest pain yesterday evening around dinnertime.  He states this is accompanied by no other symptoms, including shortness of breath, nausea, lightheadedness, or diaphoresis.  Patient states the pain does not radiate anywhere, it is located in the center of his chest, and is improved by movement or getting up and walking around.  He also complains of some pain in the center of his back between his shoulder blades, which is also improved with movement.  Patient has a history of 2 NSTEMIs, in 1998 and 2018, treated with PCI and stents.  He states his last stress test was prior to his heart catheterization in 2018.  Patient denies cough, congestion, nausea, vomiting, diarrhea, or dysuria.  He follows with [facility] cardiology.    Vital signs on admission were 98.6, 51, 16, 129/71, 94% on room air.  Troponins were 10 < 14, EKG showed sinus bradycardia without ST changes. Chest x-ray was obtained showing no acute process.  Patient was admitted for observation.    Informant(s) for H&P:Patient and chart review    REVIEW OF SYSTEMS:  A comprehensive review of systems was negative except for: what is in the HPI      PMH:  Past Medical History:   Diagnosis Date    Arthritis     CAD (coronary artery disease)     11/19/18 ethan rosa stent 2.75x18 to mid lad.     Hyperlipidemia     Hypertension        Surgical History:  Past Surgical History:   Procedure Laterality Date    APPENDECTOMY      COLONOSCOPY      HERNIA REPAIR         Medications Prior to Admission:    Prior to Admission medications    Medication Sig Start Date End Date Taking? Authorizing Provider   nitroGLYCERIN (NITROSTAT) 0.3 MG SL tablet up to max of

## 2024-01-28 NOTE — PROGRESS NOTES
4 Eyes Skin Assessment     NAME:  Skip Dia  YOB: 1950  MEDICAL RECORD NUMBER:  [id number]    The patient is being assessed for  Admission    I agree that at least one RN has performed a thorough Head to Toe Skin Assessment on the patient. ALL assessment sites listed below have been assessed.      Areas assessed by both nurses:    Head, Face, Ears, Shoulders, Back, Chest, Arms, Elbows, Hands, Sacrum. Buttock, Coccyx, Ischium, and Legs. Feet and Heels        Does the Patient have a Wound? No noted wound(s)       Min Prevention initiated by RN: No  Wound Care Orders initiated by RN: No    Pressure Injury (Stage 3,4, Unstageable, DTI, NWPT, and Complex wounds) if present, place Wound referral order by RN under : No    New Ostomies, if present place, Ostomy referral order under : No     Nurse 1 eSignature: Electronically signed by manda gonzales RN on 1/28/24 at 6:19 PM EST    **SHARE this note so that the co-signing nurse can place an eSignature**    Nurse 2 eSignature: Electronically signed by Rocío Hogue RN on 1/28/24 at 6:20 PM EST

## 2024-01-28 NOTE — PROGRESS NOTES
Called in for stress test ordered by attending. Cardiology consulted and wishes to evaluate patient prior. Notified ER staff.

## 2024-01-28 NOTE — PROGRESS NOTES
[facility] - Hospitalist   Progress Note    Admitting Date and Time: 1/27/2024 10:19 PM  Admit Dx: Chest pain [R07.9]  Status post insertion of drug-eluting stent into left anterior descending (LAD) artery [Z95.5]  Atherosclerosis of native coronary artery of native heart with stable angina pectoris (HCC) [I25.118]  Chest pain, unspecified type [R07.9]    Subjective:    Pt feels better today, states he is not really having much chest pain anymore. Is frustrated that stress test has to wait until tomorrow. Is also frustrated he was still in ER.       ROS: denies fever, chills, cp, sob, n/v, HA unless stated above.     sodium chloride flush  5-40 mL IntraVENous 2 times per day    [START ON 1/29/2024] aspirin  81 mg Oral Daily    atorvastatin  40 mg Oral Nightly    enoxaparin  40 mg SubCUTAneous Daily     sodium chloride flush, 5-40 mL, PRN  sodium chloride, , PRN  potassium chloride, 40 mEq, PRN   Or  potassium alternative oral replacement, 40 mEq, PRN   Or  potassium chloride, 10 mEq, PRN  magnesium sulfate, 2,000 mg, PRN  ondansetron, 4 mg, Q8H PRN   Or  ondansetron, 4 mg, Q6H PRN  acetaminophen, 650 mg, Q6H PRN   Or  acetaminophen, 650 mg, Q6H PRN  polyethylene glycol, 17 g, Daily PRN         Objective:    BP (!) 161/77   Pulse 63   Temp 97.6 °F (36.4 °C) (Oral)   Resp 16   Ht 1.753 m (5' 9\")   Wt 84.8 kg (187 lb)   SpO2 95%   BMI 27.62 kg/m²     General Appearance: alert and oriented to person, place and time and in no acute distress  Skin: warm and dry  Head: normocephalic and atraumatic  Eyes: pupils equal, round, and reactive to light, extraocular eye movements intact, conjunctivae normal  Neck: neck supple and non tender without mass   Pulmonary/Chest: clear to auscultation bilaterally- no wheezes, rales or rhonchi, normal air movement, no respiratory distress  Cardiovascular: bradycardic, normal S1 and S2   Abdomen: soft, non-tender, non-distended, normal bowel sounds  Extremities: no

## 2024-01-28 NOTE — CONSULTS
\"TROPONINI\" in the last 72 hours.  Lab Results   Component Value Date    PROBNP 142 (H) 12/13/2018     Lab Results   Component Value Date/Time    WBC 6.2 01/27/2024 10:45 PM    RBC 5.07 01/27/2024 10:45 PM    HGB 16.1 01/27/2024 10:45 PM    HCT 48.1 01/27/2024 10:45 PM    MCV 94.9 01/27/2024 10:45 PM    MCH 31.8 01/27/2024 10:45 PM    MCHC 33.5 01/27/2024 10:45 PM    RDW 12.5 01/27/2024 10:45 PM     01/27/2024 10:45 PM    MPV 10.7 01/27/2024 10:45 PM     Recent Labs     01/27/24  2245 01/28/24  0547   ALKPHOS 70 62   ALT 28 25   AST 30 27   PROT 7.2 6.5   BILITOT 0.5 0.8   LABALBU 4.5 4.0     Lab Results   Component Value Date/Time    MG 2.0 01/27/2024 10:45 PM     Lab Results   Component Value Date/Time    PROTIME 12.2 01/27/2024 10:45 PM    INR 1.1 01/27/2024 10:45 PM     No results found for: \"TSH\"  No components found for: \"CHLPL\"  Lab Results   Component Value Date    TRIG 121 01/28/2024     Lab Results   Component Value Date    HDL 36 (L) 01/28/2024     No results found for: \"LDLCALC\"        Active Hospital Problems    Diagnosis     Chest pain [R07.9]     Atherosclerosis of native coronary artery of native heart with stable angina pectoris (HCC) [I25.118]     HLD (hyperlipidemia) [E78.5]     HTN (hypertension) [I10]     Coronary stent restenosis [T82.855A]     Status post insertion of drug-eluting stent into left anterior descending (LAD) artery [Z95.5]              ASSESSMENT / PLAN:    #1 CAD and possible angina with extensive history of CAD/PCI as per above.  High-sensitivity troponin normal x 2  -Spinal stenosis and beta-blocker/sinus bradycardia precludes TMT and a Lexiscan study will be obtained tomorrow  -Continue baby aspirin, statin and beta-blocker as at home    #2Essential hypertension controlled    #3 hyperlipidemia with LDL mildly elevated at 76.  Consider maximizing at 80 mg daily or alternating 40 mg with 80 mg every other day    #4 sinus bradycardia without heart block or pauses.  No

## 2024-01-29 ENCOUNTER — APPOINTMENT (OUTPATIENT)
Age: 74
End: 2024-01-29
Attending: INTERNAL MEDICINE
Payer: MEDICARE

## 2024-01-29 ENCOUNTER — APPOINTMENT (OUTPATIENT)
Dept: NUCLEAR MEDICINE | Age: 74
End: 2024-01-29
Attending: INTERNAL MEDICINE
Payer: MEDICARE

## 2024-01-29 VITALS
BODY MASS INDEX: 27.7 KG/M2 | RESPIRATION RATE: 16 BRPM | SYSTOLIC BLOOD PRESSURE: 139 MMHG | OXYGEN SATURATION: 94 % | WEIGHT: 187 LBS | HEART RATE: 61 BPM | HEIGHT: 69 IN | TEMPERATURE: 97.6 F | DIASTOLIC BLOOD PRESSURE: 81 MMHG

## 2024-01-29 LAB
ALBUMIN SERPL-MCNC: 4.3 G/DL (ref 3.5–5.2)
ALP SERPL-CCNC: 66 U/L (ref 40–129)
ALT SERPL-CCNC: 27 U/L (ref 0–40)
ANION GAP SERPL CALCULATED.3IONS-SCNC: 10 MMOL/L (ref 7–16)
AST SERPL-CCNC: 29 U/L (ref 0–39)
BILIRUB SERPL-MCNC: 0.9 MG/DL (ref 0–1.2)
BUN SERPL-MCNC: 13 MG/DL (ref 6–23)
CALCIUM SERPL-MCNC: 9.7 MG/DL (ref 8.6–10.2)
CHLORIDE SERPL-SCNC: 105 MMOL/L (ref 98–107)
CO2 SERPL-SCNC: 25 MMOL/L (ref 22–29)
CREAT SERPL-MCNC: 0.9 MG/DL (ref 0.7–1.2)
ECHO BSA: 2.03 M2
ERYTHROCYTE [DISTWIDTH] IN BLOOD BY AUTOMATED COUNT: 12.5 % (ref 11.5–15)
GFR SERPL CREATININE-BSD FRML MDRD: >60 ML/MIN/1.73M2
GLUCOSE SERPL-MCNC: 107 MG/DL (ref 74–99)
HCT VFR BLD AUTO: 47.6 % (ref 37–54)
HGB BLD-MCNC: 16.1 G/DL (ref 12.5–16.5)
MCH RBC QN AUTO: 31.9 PG (ref 26–35)
MCHC RBC AUTO-ENTMCNC: 33.8 G/DL (ref 32–34.5)
MCV RBC AUTO: 94.4 FL (ref 80–99.9)
NUC REST EJECTION FRACTION: 74 %
PLATELET # BLD AUTO: 191 K/UL (ref 130–450)
PMV BLD AUTO: 11.2 FL (ref 7–12)
POTASSIUM SERPL-SCNC: 3.6 MMOL/L (ref 3.5–5)
PROT SERPL-MCNC: 6.9 G/DL (ref 6.4–8.3)
RBC # BLD AUTO: 5.04 M/UL (ref 3.8–5.8)
SODIUM SERPL-SCNC: 140 MMOL/L (ref 132–146)
STRESS TARGET HR: 148 BPM
WBC OTHER # BLD: 6 K/UL (ref 4.5–11.5)

## 2024-01-29 PROCEDURE — 80053 COMPREHEN METABOLIC PANEL: CPT

## 2024-01-29 PROCEDURE — 36415 COLL VENOUS BLD VENIPUNCTURE: CPT

## 2024-01-29 PROCEDURE — 6360000002 HC RX W HCPCS: Performed by: INTERNAL MEDICINE

## 2024-01-29 PROCEDURE — 93017 CV STRESS TEST TRACING ONLY: CPT

## 2024-01-29 PROCEDURE — 93018 CV STRESS TEST I&R ONLY: CPT | Performed by: INTERNAL MEDICINE

## 2024-01-29 PROCEDURE — 85027 COMPLETE CBC AUTOMATED: CPT

## 2024-01-29 PROCEDURE — 6370000000 HC RX 637 (ALT 250 FOR IP)

## 2024-01-29 PROCEDURE — G0378 HOSPITAL OBSERVATION PER HR: HCPCS

## 2024-01-29 PROCEDURE — 78452 HT MUSCLE IMAGE SPECT MULT: CPT | Performed by: INTERNAL MEDICINE

## 2024-01-29 PROCEDURE — 99239 HOSP IP/OBS DSCHRG MGMT >30: CPT | Performed by: STUDENT IN AN ORGANIZED HEALTH CARE EDUCATION/TRAINING PROGRAM

## 2024-01-29 PROCEDURE — A9500 TC99M SESTAMIBI: HCPCS | Performed by: RADIOLOGY

## 2024-01-29 PROCEDURE — 2580000003 HC RX 258

## 2024-01-29 PROCEDURE — 78452 HT MUSCLE IMAGE SPECT MULT: CPT

## 2024-01-29 PROCEDURE — 3430000000 HC RX DIAGNOSTIC RADIOPHARMACEUTICAL: Performed by: RADIOLOGY

## 2024-01-29 RX ORDER — REGADENOSON 0.08 MG/ML
0.4 INJECTION, SOLUTION INTRAVENOUS
Status: COMPLETED | OUTPATIENT
Start: 2024-01-29 | End: 2024-01-29

## 2024-01-29 RX ORDER — CALCIUM CARBONATE 500 MG/1
500 TABLET, CHEWABLE ORAL 3 TIMES DAILY PRN
Status: DISCONTINUED | OUTPATIENT
Start: 2024-01-29 | End: 2024-01-29 | Stop reason: HOSPADM

## 2024-01-29 RX ORDER — TETRAKIS(2-METHOXYISOBUTYLISOCYANIDE)COPPER(I) TETRAFLUOROBORATE 1 MG/ML
10 INJECTION, POWDER, LYOPHILIZED, FOR SOLUTION INTRAVENOUS
Status: COMPLETED | OUTPATIENT
Start: 2024-01-29 | End: 2024-01-29

## 2024-01-29 RX ORDER — TETRAKIS(2-METHOXYISOBUTYLISOCYANIDE)COPPER(I) TETRAFLUOROBORATE 1 MG/ML
30 INJECTION, POWDER, LYOPHILIZED, FOR SOLUTION INTRAVENOUS
Status: COMPLETED | OUTPATIENT
Start: 2024-01-29 | End: 2024-01-29

## 2024-01-29 RX ADMIN — Medication 30 MILLICURIE: at 08:21

## 2024-01-29 RX ADMIN — REGADENOSON 0.4 MG: 0.08 INJECTION, SOLUTION INTRAVENOUS at 09:27

## 2024-01-29 RX ADMIN — Medication 10 MILLICURIE: at 08:21

## 2024-01-29 RX ADMIN — ASPIRIN 81 MG CHEWABLE TABLET 81 MG: 81 TABLET CHEWABLE at 10:28

## 2024-01-29 RX ADMIN — CALCIUM CARBONATE 500 MG: 500 TABLET, CHEWABLE ORAL at 04:53

## 2024-01-29 RX ADMIN — Medication 10 ML: at 10:29

## 2024-01-29 NOTE — PROGRESS NOTES
Spoke with Dr. Post regarding discharge - patient to follow up.     Electronically signed by Angeles Suarez RN on 1/29/2024 at 1:44 PM

## 2024-01-29 NOTE — PLAN OF CARE
Problem: Safety - Adult  Goal: Free from fall injury  1/29/2024 0909 by Julia Silverman, RN  Outcome: Progressing  1/29/2024 0158 by Laura Harry, RN  Outcome: Progressing

## 2024-01-29 NOTE — DISCHARGE SUMMARY
[facility] - Hospitalist       Hospitalist Physician Discharge Summary       Willam Post MD  [address] [facility] [address]  [facility] [address] [phone]    Call        Activity level: as tolerated    Diet: ADULT DIET; Regular    Labs:as per PCP    Dispo:Home    Condition at discharge: Stable      Patient ID:  Skip Dia  [id number]  73 y.o.  1950    Admit date: 1/27/2024    Discharge date and time:  1/29/2024  1:53 PM    Admission Diagnoses: Principal Problem:    Chest pain  Active Problems:    Atherosclerosis of native coronary artery of native heart with stable angina pectoris (HCC)    HLD (hyperlipidemia)    HTN (hypertension)    Coronary stent restenosis    Status post insertion of drug-eluting stent into left anterior descending (LAD) artery  Resolved Problems:    * No resolved hospital problems. *      Discharge Diagnoses: Principal Problem:    Chest pain  Active Problems:    Atherosclerosis of native coronary artery of native heart with stable angina pectoris (HCC)    HLD (hyperlipidemia)    HTN (hypertension)    Coronary stent restenosis    Status post insertion of drug-eluting stent into left anterior descending (LAD) artery  Resolved Problems:    * No resolved hospital problems. *      Consults:  IP CONSULT TO CARDIOLOGY    Procedures: Stress test    Hospital Course: Patient was admitted with chest pain. Troponins 14, 11, 14. EKG no changes. Cardiology was consulted as patient has history of CAD. Stress test ordered by cardiology which was negative and they cleared patient for discharge. Per cardiology patient is to take aspirin indefinitely and not to be interrupted for any future surgical procedures given presence of prior stents. Patient discharged home in stable condition. Needs to FU with PCP and cardiology.     Discharge Exam:  Vitals:    01/28/24 1411 01/28/24 1515 01/28/24 2011 01/29/24 0630   BP: (!) 134/91 (!) 161/77 133/79 139/81   Pulse: 56 63 70 61   Resp: 17 16 18 16

## 2024-01-29 NOTE — PROGRESS NOTES
DAILY PROGRESS NOTE -[facility] CARDIOLOGY    SUBJECTIVE:    Seen in stress lab this morning in follow-up of chest discomfort.  Seen in the stress lab and states he had an uneventful night with no cardiac complaints.  Lexiscan given with no ECG changes and images are pending.    Hypertension, hyperlipidemia, CAD post LAD and RCA stents in 1998 with in-stent restenosis and subsequent drug-eluting stent placement LAD 2018.    OBJECTIVE:    His vital signs were reviewed today.    Vitals:    01/29/24 0630   BP: 139/81   Pulse: 61   Resp: 16   Temp: 97.6 °F (36.4 °C)   SpO2: 94%       Scheduled Meds:   sodium chloride flush  5-40 mL IntraVENous 2 times per day    aspirin  81 mg Oral Daily    atorvastatin  40 mg Oral Nightly    enoxaparin  40 mg SubCUTAneous Daily     Continuous Infusions:   sodium chloride       PRN Meds:.regadenoson, calcium carbonate, sodium chloride flush, sodium chloride, potassium chloride **OR** potassium alternative oral replacement **OR** potassium chloride, magnesium sulfate, ondansetron **OR** ondansetron, acetaminophen **OR** acetaminophen, polyethylene glycol    REVIEW OF SYSTEMS:     No pruritus, rash, bruising.  Cardiac and pulmonary symptoms per HPI.  No nausea, vomiting, abdominal pain, GI bleeding, change in bowel habits.  No dysuria, urinary frequency, urgency, hematuria, flank pain.  Joint pain but no muscle soreness, stiffness, aching.  No headache, speech disturbance, lateralizing neurologic deficit.  No hemoptysis, epistaxis, easy bruising.  No anxiety, depression.  No symptoms of hypothyroidism, hyperthyroidism, diabetes, heat or cold intolerance.    FAMILY HISTORY: Negative for CAD in first-degree relatives.    SOCIAL HISTORY: Negative for alcohol, tobacco, or illicit drug use.      PHYSICAL EXAM:    General Appearance:  awake, alert, oriented, in no acute distress  Neck:  no bruits  Lungs:  Normal expansion.  Clear to auscultation.  No rales, rhonchi, or wheezing.  Heart:  Heart

## 2024-01-30 LAB
EKG ATRIAL RATE: 45 BPM
EKG ATRIAL RATE: 49 BPM
EKG ATRIAL RATE: 51 BPM
EKG ATRIAL RATE: 52 BPM
EKG P AXIS: 24 DEGREES
EKG P AXIS: 26 DEGREES
EKG P AXIS: 27 DEGREES
EKG P AXIS: 35 DEGREES
EKG P-R INTERVAL: 160 MS
EKG P-R INTERVAL: 168 MS
EKG P-R INTERVAL: 170 MS
EKG P-R INTERVAL: 174 MS
EKG Q-T INTERVAL: 438 MS
EKG Q-T INTERVAL: 438 MS
EKG Q-T INTERVAL: 456 MS
EKG Q-T INTERVAL: 460 MS
EKG QRS DURATION: 78 MS
EKG QRS DURATION: 80 MS
EKG QRS DURATION: 86 MS
EKG QRS DURATION: 86 MS
EKG QTC CALCULATION (BAZETT): 397 MS
EKG QTC CALCULATION (BAZETT): 403 MS
EKG QTC CALCULATION (BAZETT): 407 MS
EKG QTC CALCULATION (BAZETT): 411 MS
EKG R AXIS: -12 DEGREES
EKG R AXIS: 0 DEGREES
EKG R AXIS: 2 DEGREES
EKG R AXIS: 8 DEGREES
EKG T AXIS: 50 DEGREES
EKG T AXIS: 56 DEGREES
EKG T AXIS: 58 DEGREES
EKG T AXIS: 69 DEGREES
EKG VENTRICULAR RATE: 45 BPM
EKG VENTRICULAR RATE: 49 BPM
EKG VENTRICULAR RATE: 51 BPM
EKG VENTRICULAR RATE: 52 BPM

## 2025-08-29 ENCOUNTER — HOSPITAL ENCOUNTER (EMERGENCY)
Age: 75
Discharge: HOME | End: 2025-08-29
Attending: EMERGENCY MEDICINE
Payer: MEDICARE

## 2025-08-29 ENCOUNTER — APPOINTMENT (OUTPATIENT)
Dept: GENERAL RADIOLOGY | Age: 75
End: 2025-08-29
Payer: MEDICARE

## 2025-08-29 VITALS
SYSTOLIC BLOOD PRESSURE: 133 MMHG | RESPIRATION RATE: 16 BRPM | HEIGHT: 69 IN | DIASTOLIC BLOOD PRESSURE: 75 MMHG | WEIGHT: 178 LBS | OXYGEN SATURATION: 99 % | TEMPERATURE: 98 F | HEART RATE: 59 BPM | BODY MASS INDEX: 26.36 KG/M2

## 2025-08-29 DIAGNOSIS — R07.9 CHEST PAIN, UNSPECIFIED TYPE: Primary | ICD-10-CM

## 2025-08-29 LAB
ALBUMIN SERPL-MCNC: 4.3 G/DL (ref 3.5–5.2)
ALP SERPL-CCNC: 74 U/L (ref 40–129)
ALT SERPL-CCNC: 29 U/L (ref 0–50)
ANION GAP SERPL CALCULATED.3IONS-SCNC: 13 MMOL/L (ref 7–16)
AST SERPL-CCNC: 31 U/L (ref 0–50)
BASOPHILS # BLD: 0.07 K/UL (ref 0–0.2)
BASOPHILS NFR BLD: 1 % (ref 0–2)
BILIRUB SERPL-MCNC: 0.8 MG/DL (ref 0–1.2)
BUN SERPL-MCNC: 14 MG/DL (ref 8–23)
CALCIUM SERPL-MCNC: 9.8 MG/DL (ref 8.8–10.2)
CHLORIDE SERPL-SCNC: 103 MMOL/L (ref 98–107)
CO2 SERPL-SCNC: 25 MMOL/L (ref 22–29)
CREAT SERPL-MCNC: 1 MG/DL (ref 0.7–1.2)
EKG ATRIAL RATE: 57 BPM
EKG P AXIS: 24 DEGREES
EKG P-R INTERVAL: 158 MS
EKG Q-T INTERVAL: 440 MS
EKG QRS DURATION: 86 MS
EKG QTC CALCULATION (BAZETT): 428 MS
EKG R AXIS: 13 DEGREES
EKG T AXIS: 62 DEGREES
EKG VENTRICULAR RATE: 57 BPM
EOSINOPHIL # BLD: 0.17 K/UL (ref 0.05–0.5)
EOSINOPHILS RELATIVE PERCENT: 3 % (ref 0–6)
ERYTHROCYTE [DISTWIDTH] IN BLOOD BY AUTOMATED COUNT: 12.8 % (ref 11.5–15)
GFR, ESTIMATED: 81 ML/MIN/1.73M2
GLUCOSE SERPL-MCNC: 123 MG/DL (ref 74–99)
HCT VFR BLD AUTO: 44.3 % (ref 37–54)
HGB BLD-MCNC: 15.5 G/DL (ref 12.5–16.5)
IMM GRANULOCYTES # BLD AUTO: <0.03 K/UL (ref 0–0.58)
IMM GRANULOCYTES NFR BLD: 0 % (ref 0–5)
LYMPHOCYTES NFR BLD: 1.04 K/UL (ref 1.5–4)
LYMPHOCYTES RELATIVE PERCENT: 16 % (ref 20–42)
MCH RBC QN AUTO: 32.8 PG (ref 26–35)
MCHC RBC AUTO-ENTMCNC: 35 G/DL (ref 32–34.5)
MCV RBC AUTO: 93.9 FL (ref 80–99.9)
MONOCYTES NFR BLD: 0.69 K/UL (ref 0.1–0.95)
MONOCYTES NFR BLD: 11 % (ref 2–12)
NEUTROPHILS NFR BLD: 69 % (ref 43–80)
NEUTS SEG NFR BLD: 4.53 K/UL (ref 1.8–7.3)
PLATELET # BLD AUTO: 167 K/UL (ref 130–450)
PMV BLD AUTO: 11 FL (ref 7–12)
POTASSIUM SERPL-SCNC: 3.9 MMOL/L (ref 3.5–5.1)
PROT SERPL-MCNC: 7.1 G/DL (ref 6.4–8.3)
RBC # BLD AUTO: 4.72 M/UL (ref 3.8–5.8)
SODIUM SERPL-SCNC: 140 MMOL/L (ref 136–145)
TROPONIN I SERPL HS-MCNC: 12 NG/L (ref 0–22)
WBC OTHER # BLD: 6.5 K/UL (ref 4.5–11.5)

## 2025-08-29 PROCEDURE — 85025 COMPLETE CBC W/AUTO DIFF WBC: CPT

## 2025-08-29 PROCEDURE — 99285 EMERGENCY DEPT VISIT HI MDM: CPT

## 2025-08-29 PROCEDURE — 71045 X-RAY EXAM CHEST 1 VIEW: CPT

## 2025-08-29 PROCEDURE — 84484 ASSAY OF TROPONIN QUANT: CPT

## 2025-08-29 PROCEDURE — 80053 COMPREHEN METABOLIC PANEL: CPT

## 2025-08-29 ASSESSMENT — PAIN SCALES - GENERAL: PAINLEVEL_OUTOF10: 2

## 2025-08-29 ASSESSMENT — PAIN DESCRIPTION - FREQUENCY: FREQUENCY: INTERMITTENT

## 2025-08-29 ASSESSMENT — PAIN DESCRIPTION - LOCATION: LOCATION: CHEST

## 2025-08-29 ASSESSMENT — PAIN DESCRIPTION - PAIN TYPE: TYPE: ACUTE PAIN

## 2025-08-29 ASSESSMENT — PAIN DESCRIPTION - DESCRIPTORS: DESCRIPTORS: DISCOMFORT

## 2025-09-04 LAB
EKG ATRIAL RATE: 57 BPM
EKG P AXIS: 24 DEGREES
EKG P-R INTERVAL: 158 MS
EKG Q-T INTERVAL: 440 MS
EKG QRS DURATION: 86 MS
EKG QTC CALCULATION (BAZETT): 428 MS
EKG R AXIS: 13 DEGREES
EKG T AXIS: 62 DEGREES
EKG VENTRICULAR RATE: 57 BPM